# Patient Record
Sex: FEMALE | Race: WHITE | NOT HISPANIC OR LATINO | Employment: UNEMPLOYED | URBAN - METROPOLITAN AREA
[De-identification: names, ages, dates, MRNs, and addresses within clinical notes are randomized per-mention and may not be internally consistent; named-entity substitution may affect disease eponyms.]

---

## 2020-10-19 PROBLEM — O43.199 MARGINAL INSERTION OF UMBILICAL CORD AFFECTING MANAGEMENT OF MOTHER: Status: ACTIVE | Noted: 2020-10-19

## 2020-10-19 PROBLEM — Z98.890 H/O LEEP: Status: ACTIVE | Noted: 2020-10-19

## 2020-10-19 PROBLEM — Z34.93 NORMAL PREGNANCY IN THIRD TRIMESTER: Status: ACTIVE | Noted: 2020-10-19

## 2020-10-19 PROBLEM — Z3A.30 30 WEEKS GESTATION OF PREGNANCY: Status: ACTIVE | Noted: 2020-10-19

## 2020-11-02 PROBLEM — Z3A.32 32 WEEKS GESTATION OF PREGNANCY: Status: ACTIVE | Noted: 2020-10-19

## 2020-11-16 ENCOUNTER — SOCIAL WORK (OUTPATIENT)
Dept: ADMINISTRATIVE | Facility: OTHER | Age: 30
End: 2020-11-16

## 2020-11-16 NOTE — PROGRESS NOTES
BENJY received paperwork from [address] regarding MD signature for pt breast pump. SW provide MD the paperwork for his signature. SW later received the completed paperwork and fax to([phone]). SW scanned paperwork into epic. No other needs identified at this time.     Kamila Kraus,MSW   Pager#[phone]

## 2020-11-18 PROBLEM — Z3A.34 34 WEEKS GESTATION OF PREGNANCY: Status: ACTIVE | Noted: 2020-10-19

## 2020-12-04 PROBLEM — Z3A.36 36 WEEKS GESTATION OF PREGNANCY: Status: ACTIVE | Noted: 2020-10-19

## 2020-12-18 PROBLEM — Z3A.38 38 WEEKS GESTATION OF PREGNANCY: Status: ACTIVE | Noted: 2020-10-19

## 2020-12-22 PROBLEM — Z3A.39 39 WEEKS GESTATION OF PREGNANCY: Status: RESOLVED | Noted: 2020-10-19 | Resolved: 2020-12-22

## 2020-12-22 PROBLEM — Z3A.39 39 WEEKS GESTATION OF PREGNANCY: Status: ACTIVE | Noted: 2020-10-19

## 2020-12-22 PROBLEM — Z37.9 VACUUM-ASSISTED VAGINAL DELIVERY: Status: ACTIVE | Noted: 2020-12-22

## 2021-02-05 PROBLEM — O43.199 MARGINAL INSERTION OF UMBILICAL CORD AFFECTING MANAGEMENT OF MOTHER: Status: RESOLVED | Noted: 2020-10-19 | Resolved: 2021-02-05

## 2021-12-06 PROBLEM — Z37.9 VACUUM-ASSISTED VAGINAL DELIVERY: Status: RESOLVED | Noted: 2020-12-22 | Resolved: 2021-12-06

## 2022-04-22 PROBLEM — Z87.59 HISTORY OF SHOULDER DYSTOCIA IN PRIOR PREGNANCY: Status: ACTIVE | Noted: 2020-12-22

## 2022-04-22 PROBLEM — Z3A.11 11 WEEKS GESTATION OF PREGNANCY: Status: ACTIVE | Noted: 2022-04-22

## 2022-04-22 PROBLEM — Z34.91 NORMAL PREGNANCY IN FIRST TRIMESTER: Status: ACTIVE | Noted: 2020-10-19

## 2022-05-27 PROBLEM — Z34.92 NORMAL PREGNANCY IN SECOND TRIMESTER: Status: ACTIVE | Noted: 2020-10-19

## 2022-05-27 PROBLEM — Z3A.16 16 WEEKS GESTATION OF PREGNANCY: Status: ACTIVE | Noted: 2022-04-22

## 2023-05-08 PROBLEM — Z3A.16 16 WEEKS GESTATION OF PREGNANCY: Status: RESOLVED | Noted: 2022-04-22 | Resolved: 2023-05-08
